# Patient Record
Sex: MALE
[De-identification: names, ages, dates, MRNs, and addresses within clinical notes are randomized per-mention and may not be internally consistent; named-entity substitution may affect disease eponyms.]

---

## 2018-04-17 ENCOUNTER — HOSPITAL ENCOUNTER (OUTPATIENT)
Dept: HOSPITAL 90 - SHCH | Age: 83
Discharge: HOME | End: 2018-04-17
Attending: INTERNAL MEDICINE
Payer: MEDICARE

## 2018-04-17 VITALS — HEIGHT: 64 IN | BODY MASS INDEX: 21.34 KG/M2 | WEIGHT: 125 LBS

## 2018-04-17 DIAGNOSIS — I25.119: Primary | ICD-10-CM

## 2018-04-17 PROCEDURE — 78452 HT MUSCLE IMAGE SPECT MULT: CPT

## 2018-04-17 PROCEDURE — 93017 CV STRESS TEST TRACING ONLY: CPT

## 2018-04-17 PROCEDURE — 96374 THER/PROPH/DIAG INJ IV PUSH: CPT

## 2019-01-19 ENCOUNTER — HOSPITAL ENCOUNTER (INPATIENT)
Dept: HOSPITAL 90 - EDH | Age: 84
LOS: 7 days | Discharge: NURSING, REHAB OR LONG-TERM CARE | End: 2019-01-26
Payer: MEDICARE

## 2019-01-19 DIAGNOSIS — A41.9: Primary | ICD-10-CM

## 2019-01-19 DIAGNOSIS — R64: ICD-10-CM

## 2019-01-19 DIAGNOSIS — I25.10: ICD-10-CM

## 2019-01-19 DIAGNOSIS — J96.01: ICD-10-CM

## 2019-01-19 DIAGNOSIS — J18.9: ICD-10-CM

## 2019-10-15 ENCOUNTER — HOSPITAL ENCOUNTER (INPATIENT)
Dept: HOSPITAL 90 - EDH | Age: 84
LOS: 1 days | Discharge: HOSPICE | DRG: 871 | End: 2019-10-16
Attending: INTERNAL MEDICINE | Admitting: INTERNAL MEDICINE
Payer: MEDICARE

## 2019-10-15 VITALS — WEIGHT: 131.5 LBS | BODY MASS INDEX: 19.93 KG/M2 | HEIGHT: 68 IN

## 2019-10-15 DIAGNOSIS — I25.10: ICD-10-CM

## 2019-10-15 DIAGNOSIS — E78.5: ICD-10-CM

## 2019-10-15 DIAGNOSIS — I10: ICD-10-CM

## 2019-10-15 DIAGNOSIS — K76.7: ICD-10-CM

## 2019-10-15 DIAGNOSIS — Z79.02: ICD-10-CM

## 2019-10-15 DIAGNOSIS — K72.00: ICD-10-CM

## 2019-10-15 DIAGNOSIS — E11.9: ICD-10-CM

## 2019-10-15 DIAGNOSIS — K72.90: ICD-10-CM

## 2019-10-15 DIAGNOSIS — K74.60: ICD-10-CM

## 2019-10-15 DIAGNOSIS — R64: ICD-10-CM

## 2019-10-15 DIAGNOSIS — K81.0: ICD-10-CM

## 2019-10-15 DIAGNOSIS — J81.1: ICD-10-CM

## 2019-10-15 DIAGNOSIS — I50.22: ICD-10-CM

## 2019-10-15 DIAGNOSIS — R65.21: ICD-10-CM

## 2019-10-15 DIAGNOSIS — E87.70: ICD-10-CM

## 2019-10-15 DIAGNOSIS — D68.9: ICD-10-CM

## 2019-10-15 DIAGNOSIS — Z51.5: ICD-10-CM

## 2019-10-15 DIAGNOSIS — N17.9: ICD-10-CM

## 2019-10-15 DIAGNOSIS — A41.9: Primary | ICD-10-CM

## 2019-10-15 DIAGNOSIS — G93.40: ICD-10-CM

## 2019-10-15 DIAGNOSIS — I48.91: ICD-10-CM

## 2019-10-15 DIAGNOSIS — R18.8: ICD-10-CM

## 2019-10-15 DIAGNOSIS — B17.9: ICD-10-CM

## 2019-10-15 DIAGNOSIS — J90: ICD-10-CM

## 2019-10-15 DIAGNOSIS — Z79.899: ICD-10-CM

## 2019-10-15 LAB
ALBUMIN SERPL-MCNC: 3.2 G/DL (ref 3.5–5)
APPEARANCE UR: (no result)
APTT PPP: 27 SEC (ref 26.3–35.5)
AST SERPL-CCNC: 1873 U/L (ref 10–37)
BASE EXCESS BLDA CALC-SCNC: -13.4 MMOL/L (ref -2–3)
BASOPHILS NFR BLD AUTO: 0.3 % (ref 0–5)
BILIRUB DIRECT SERPL-MCNC: 1.2 MG/DL (ref 0–0.3)
BILIRUB SERPL-MCNC: 2 MG/DL (ref 0.2–1)
BILIRUB UR QL STRIP: (no result)
BNP SERPL-MCNC: 1830 PG/ML (ref 0–100)
BUN SERPL-MCNC: 57 MG/DL (ref 7–18)
CHLORIDE SERPL-SCNC: 97 MMOL/L (ref 101–111)
CK SERPL-CCNC: 205 U/L (ref 21–232)
CO2 SERPL-SCNC: 13 MMOL/L (ref 21–32)
COLOR UR: (no result)
CREAT SERPL-MCNC: 2.8 MG/DL (ref 0.5–1.5)
DEPRECATED SQUAMOUS URNS QL MICRO: (no result) /HPF (ref 0–2)
EOSINOPHIL NFR BLD AUTO: 0 % (ref 0–8)
ERYTHROCYTE [DISTWIDTH] IN BLOOD BY AUTOMATED COUNT: 16.6 % (ref 11–15.5)
GFR SERPL CREATININE-BSD FRML MDRD: 23 ML/MIN (ref 60–?)
GLUCOSE SERPL-MCNC: 97 MG/DL (ref 70–105)
GLUCOSE UR STRIP-MCNC: NEGATIVE MG/DL
HCO3 BLDA-SCNC: 10.5 MMOL/L (ref 21–28)
HCT VFR BLD AUTO: 41.3 % (ref 42–54)
HGB UR QL STRIP: (no result)
INR PPP: 1.69 (ref 0.85–1.15)
KETONES UR STRIP-MCNC: 15 MG/DL
LEUKOCYTE ESTERASE UR QL STRIP: (no result)
LYMPHOCYTES NFR SPEC AUTO: 7.7 % (ref 21–51)
MCH RBC QN AUTO: 33.3 PG (ref 27–33)
MCHC RBC AUTO-ENTMCNC: 33.3 G/DL (ref 32–36)
MCV RBC AUTO: 100 FL (ref 79–99)
MONOCYTES NFR BLD AUTO: 9.3 % (ref 3–13)
NEUTROPHILS NFR BLD AUTO: 82.7 % (ref 40–77)
NITRITE UR QL STRIP: POSITIVE
NRBC BLD MANUAL-RTO: 0 % (ref 0–0.19)
PCO2 BLDA: 21 MMHG (ref 35–48)
PH UR STRIP: 6.5 [PH] (ref 5–8)
PLATELET # BLD AUTO: 197 K/UL (ref 130–400)
POTASSIUM SERPL-SCNC: 6.3 MMOL/L (ref 3.5–5.1)
PROT SERPL-MCNC: 6.8 G/DL (ref 6–8.3)
PROT UR QL STRIP: >=300 MG/DL
PROTHROMBIN TIME: 17.4 SEC (ref 9.6–11.6)
RBC # BLD AUTO: 4.13 MIL/UL (ref 4.5–6.2)
RBC #/AREA URNS HPF: (no result) /HPF (ref 0–1)
SAO2 % BLDA: 94.3 % (ref 95–99)
SODIUM SERPL-SCNC: 130 MMOL/L (ref 136–145)
SP GR UR STRIP: 1.03 (ref 1–1.03)
UROBILINOGEN UR STRIP-MCNC: >=8 MG/DL (ref 0.2–1)
WBC # BLD AUTO: 9.8 K/UL (ref 4.8–10.8)
WBC #/AREA URNS HPF: (no result) /HPF (ref 0–1)

## 2019-10-15 PROCEDURE — 82550 ASSAY OF CK (CPK): CPT

## 2019-10-15 PROCEDURE — 85730 THROMBOPLASTIN TIME PARTIAL: CPT

## 2019-10-15 PROCEDURE — 84484 ASSAY OF TROPONIN QUANT: CPT

## 2019-10-15 PROCEDURE — 80053 COMPREHEN METABOLIC PANEL: CPT

## 2019-10-15 PROCEDURE — 99291 CRITICAL CARE FIRST HOUR: CPT

## 2019-10-15 PROCEDURE — 85027 COMPLETE CBC AUTOMATED: CPT

## 2019-10-15 PROCEDURE — 83605 ASSAY OF LACTIC ACID: CPT

## 2019-10-15 PROCEDURE — 80048 BASIC METABOLIC PNL TOTAL CA: CPT

## 2019-10-15 PROCEDURE — 85610 PROTHROMBIN TIME: CPT

## 2019-10-15 PROCEDURE — 83735 ASSAY OF MAGNESIUM: CPT

## 2019-10-15 PROCEDURE — 84132 ASSAY OF SERUM POTASSIUM: CPT

## 2019-10-15 PROCEDURE — 81001 URINALYSIS AUTO W/SCOPE: CPT

## 2019-10-15 PROCEDURE — 82948 REAGENT STRIP/BLOOD GLUCOSE: CPT

## 2019-10-15 PROCEDURE — 36600 WITHDRAWAL OF ARTERIAL BLOOD: CPT

## 2019-10-15 PROCEDURE — 80074 ACUTE HEPATITIS PANEL: CPT

## 2019-10-15 PROCEDURE — 83880 ASSAY OF NATRIURETIC PEPTIDE: CPT

## 2019-10-15 PROCEDURE — 85025 COMPLETE CBC W/AUTO DIFF WBC: CPT

## 2019-10-15 PROCEDURE — 93005 ELECTROCARDIOGRAM TRACING: CPT

## 2019-10-15 PROCEDURE — 36415 COLL VENOUS BLD VENIPUNCTURE: CPT

## 2019-10-15 PROCEDURE — 82803 BLOOD GASES ANY COMBINATION: CPT

## 2019-10-15 PROCEDURE — 71045 X-RAY EXAM CHEST 1 VIEW: CPT

## 2019-10-15 PROCEDURE — 94640 AIRWAY INHALATION TREATMENT: CPT

## 2019-10-15 PROCEDURE — 76705 ECHO EXAM OF ABDOMEN: CPT

## 2019-10-15 PROCEDURE — 87040 BLOOD CULTURE FOR BACTERIA: CPT

## 2019-10-15 PROCEDURE — 80076 HEPATIC FUNCTION PANEL: CPT

## 2019-10-16 VITALS — SYSTOLIC BLOOD PRESSURE: 118 MMHG | DIASTOLIC BLOOD PRESSURE: 75 MMHG

## 2019-10-16 VITALS — SYSTOLIC BLOOD PRESSURE: 136 MMHG | DIASTOLIC BLOOD PRESSURE: 64 MMHG

## 2019-10-16 VITALS — SYSTOLIC BLOOD PRESSURE: 121 MMHG | DIASTOLIC BLOOD PRESSURE: 75 MMHG

## 2019-10-16 VITALS — DIASTOLIC BLOOD PRESSURE: 81 MMHG | SYSTOLIC BLOOD PRESSURE: 152 MMHG

## 2019-10-16 VITALS — DIASTOLIC BLOOD PRESSURE: 83 MMHG | SYSTOLIC BLOOD PRESSURE: 150 MMHG

## 2019-10-16 VITALS — DIASTOLIC BLOOD PRESSURE: 58 MMHG | SYSTOLIC BLOOD PRESSURE: 139 MMHG

## 2019-10-16 VITALS — DIASTOLIC BLOOD PRESSURE: 87 MMHG | SYSTOLIC BLOOD PRESSURE: 152 MMHG

## 2019-10-16 VITALS — SYSTOLIC BLOOD PRESSURE: 107 MMHG | DIASTOLIC BLOOD PRESSURE: 73 MMHG

## 2019-10-16 VITALS — SYSTOLIC BLOOD PRESSURE: 160 MMHG | DIASTOLIC BLOOD PRESSURE: 87 MMHG

## 2019-10-16 VITALS — SYSTOLIC BLOOD PRESSURE: 157 MMHG | DIASTOLIC BLOOD PRESSURE: 84 MMHG

## 2019-10-16 VITALS — DIASTOLIC BLOOD PRESSURE: 52 MMHG | SYSTOLIC BLOOD PRESSURE: 132 MMHG

## 2019-10-16 VITALS — SYSTOLIC BLOOD PRESSURE: 122 MMHG | DIASTOLIC BLOOD PRESSURE: 65 MMHG

## 2019-10-16 VITALS — DIASTOLIC BLOOD PRESSURE: 92 MMHG | SYSTOLIC BLOOD PRESSURE: 139 MMHG

## 2019-10-16 VITALS — SYSTOLIC BLOOD PRESSURE: 147 MMHG | DIASTOLIC BLOOD PRESSURE: 81 MMHG

## 2019-10-16 VITALS — DIASTOLIC BLOOD PRESSURE: 60 MMHG | SYSTOLIC BLOOD PRESSURE: 162 MMHG

## 2019-10-16 VITALS — DIASTOLIC BLOOD PRESSURE: 78 MMHG | SYSTOLIC BLOOD PRESSURE: 138 MMHG

## 2019-10-16 VITALS — SYSTOLIC BLOOD PRESSURE: 159 MMHG | DIASTOLIC BLOOD PRESSURE: 83 MMHG

## 2019-10-16 VITALS — DIASTOLIC BLOOD PRESSURE: 76 MMHG | SYSTOLIC BLOOD PRESSURE: 143 MMHG

## 2019-10-16 VITALS — SYSTOLIC BLOOD PRESSURE: 130 MMHG | DIASTOLIC BLOOD PRESSURE: 76 MMHG

## 2019-10-16 VITALS — SYSTOLIC BLOOD PRESSURE: 145 MMHG | DIASTOLIC BLOOD PRESSURE: 90 MMHG

## 2019-10-16 VITALS — DIASTOLIC BLOOD PRESSURE: 83 MMHG | SYSTOLIC BLOOD PRESSURE: 148 MMHG

## 2019-10-16 VITALS — SYSTOLIC BLOOD PRESSURE: 116 MMHG | DIASTOLIC BLOOD PRESSURE: 91 MMHG

## 2019-10-16 LAB
ALBUMIN SERPL-MCNC: 3.1 G/DL (ref 3.5–5)
BILIRUB SERPL-MCNC: 1.9 MG/DL (ref 0.2–1)
BUN SERPL-MCNC: 62 MG/DL (ref 7–18)
CHLORIDE SERPL-SCNC: 100 MMOL/L (ref 101–111)
CO2 SERPL-SCNC: 14 MMOL/L (ref 21–32)
CREAT SERPL-MCNC: 2.5 MG/DL (ref 0.5–1.5)
ERYTHROCYTE [DISTWIDTH] IN BLOOD BY AUTOMATED COUNT: 16.4 % (ref 11–15.5)
GFR SERPL CREATININE-BSD FRML MDRD: 26 ML/MIN (ref 60–?)
GLUCOSE SERPL-MCNC: 67 MG/DL (ref 70–105)
HCT VFR BLD AUTO: 35.3 % (ref 42–54)
MAGNESIUM SERPL-MCNC: 1.9 MG/DL (ref 1.8–2.4)
MCH RBC QN AUTO: 33.5 PG (ref 27–33)
MCHC RBC AUTO-ENTMCNC: 33.9 G/DL (ref 32–36)
MCV RBC AUTO: 98.8 FL (ref 79–99)
NRBC BLD MANUAL-RTO: 0 % (ref 0–0.19)
PLATELET # BLD AUTO: 150 K/UL (ref 130–400)
POTASSIUM SERPL-SCNC: 5.9 MMOL/L (ref 3.5–5.1)
PROT SERPL-MCNC: 6.1 G/DL (ref 6–8.3)
RBC # BLD AUTO: 3.57 MIL/UL (ref 4.5–6.2)
SODIUM SERPL-SCNC: 131 MMOL/L (ref 136–145)
WBC # BLD AUTO: 12.7 K/UL (ref 4.8–10.8)

## 2019-10-16 RX ADMIN — OCTREOTIDE ACETATE SCH MCG: 100 INJECTION, SOLUTION INTRAVENOUS; SUBCUTANEOUS at 08:53

## 2019-10-16 RX ADMIN — NITROGLYCERIN SCH INCH: 20 OINTMENT TOPICAL at 06:29

## 2019-10-16 RX ADMIN — NITROGLYCERIN SCH INCH: 20 OINTMENT TOPICAL at 12:30

## 2019-10-16 RX ADMIN — SODIUM POLYSTYRENE SULFONATE NR GM: 15 SUSPENSION ORAL; RECTAL at 01:29

## 2019-10-16 RX ADMIN — NITROGLYCERIN SCH INCH: 20 OINTMENT TOPICAL at 06:03

## 2019-10-16 RX ADMIN — OCTREOTIDE ACETATE SCH MCG: 100 INJECTION, SOLUTION INTRAVENOUS; SUBCUTANEOUS at 00:55

## 2019-10-16 RX ADMIN — Medication SCH MLS/HR: at 13:08

## 2019-10-16 RX ADMIN — SODIUM POLYSTYRENE SULFONATE NR GM: 15 SUSPENSION ORAL; RECTAL at 04:43

## 2019-10-16 RX ADMIN — ALBUMIN HUMAN SCH MLS/HR: 50 SOLUTION INTRAVENOUS at 18:00

## 2019-10-16 RX ADMIN — MIDODRINE HYDROCHLORIDE SCH MG: 5 TABLET ORAL at 16:00

## 2019-10-16 RX ADMIN — Medication SCH MLS/HR: at 00:30

## 2019-10-16 RX ADMIN — DEXTROSE SCH MEQ: 10 SOLUTION INTRAVENOUS at 00:45

## 2019-10-16 RX ADMIN — PIPERACILLIN SODIUM AND TAZOBACTAM SODIUM SCH MLS/HR: .375; 3 INJECTION, POWDER, LYOPHILIZED, FOR SOLUTION INTRAVENOUS at 06:02

## 2019-10-16 RX ADMIN — PIPERACILLIN SODIUM AND TAZOBACTAM SODIUM SCH MLS/HR: .375; 3 INJECTION, POWDER, LYOPHILIZED, FOR SOLUTION INTRAVENOUS at 18:00

## 2019-10-16 RX ADMIN — DEXTROSE SCH MEQ: 10 SOLUTION INTRAVENOUS at 05:43

## 2019-10-16 RX ADMIN — NITROGLYCERIN SCH INCH: 20 OINTMENT TOPICAL at 18:30

## 2019-10-16 RX ADMIN — ALBUMIN HUMAN SCH MLS/HR: 50 SOLUTION INTRAVENOUS at 13:07

## 2019-10-16 RX ADMIN — ALBUMIN HUMAN SCH MLS/HR: 50 SOLUTION INTRAVENOUS at 06:03

## 2019-10-16 RX ADMIN — OCTREOTIDE ACETATE SCH MCG: 100 INJECTION, SOLUTION INTRAVENOUS; SUBCUTANEOUS at 16:00

## 2019-10-16 RX ADMIN — MIDODRINE HYDROCHLORIDE SCH MG: 5 TABLET ORAL at 08:52

## 2019-10-16 RX ADMIN — MIDODRINE HYDROCHLORIDE SCH MG: 5 TABLET ORAL at 00:46

## 2019-10-16 RX ADMIN — NITROGLYCERIN SCH INCH: 20 OINTMENT TOPICAL at 00:00

## 2019-10-16 RX ADMIN — ALBUMIN HUMAN SCH MLS/HR: 50 SOLUTION INTRAVENOUS at 00:45

## 2019-10-16 RX ADMIN — SODIUM CHLORIDE SCH GM: 9 INJECTION, SOLUTION INTRAVENOUS at 05:43

## 2019-10-16 RX ADMIN — SODIUM CHLORIDE SCH GM: 9 INJECTION, SOLUTION INTRAVENOUS at 00:45

## 2019-10-16 NOTE — NUR
DCP:HOME WITH FAMILY



Sw met with pt and grand daughter Delaney Tena [phone]. Pt lives with son Rio Tena and wife 
Maki [phone]. Pt is independent of ADLS, has provider 3hrs day [facility] to assist 
with home management and meal prep. Pt has no DME or HH services. PCP is [facility] Gonzalez and 
[facility] is pharm of choice. Pt has no MPOa or directives, is his own decision maker. Plan is 
home with family

-------------------------------------------------------------------------------

Addendum: 10/16/19 at 1129 by MELISSA MOSQUEDA

-------------------------------------------------------------------------------

Amended: Links added.

## 2019-10-16 NOTE — NUR
DISCHARGE

[facility] HERE TO TAKE PT HOME ON HOSPICE DISCHARGE INFORMATION GIVEN TO SON WITH HOSPICE PHONE 
NUMBER. ALL BELONGINGS AND MEDICATIONS TAKEN BY SON

## 2019-10-16 NOTE — NUR
RECEIVED

BY ANNA FROM ER. MOVED TO ICU . PLACED ON CARDIAC MONITOR PULSE OXIMETER NIBP 
PULSE OX. DENIES PAIN. SON HERE WHO IS HIS CARE GIVER AND ANSWERS ADMISSION QUESTIONS. CALL 
LIGHT GIVEN AND EXPLAINED.

## 2019-10-16 NOTE — NUR
HOME WITH [facility] [phone]



Sw met with pt's son whom pt has lived with for the past 30years. Per son, he is agreeable 
to home with [facility] [phone]. Signed signed consent and OOHDNR.

Candi spoke to Leena at [facility] who met with son and consents were signed. Leena ordered DME 
and dc pending delivery. Leena to have their medical director sign OOHDNR

Cm informed of need for ambulance transport.

## (undated) PROCEDURE — 5A1945Z RESPIRATORY VENTILATION, 24-96 CONSECUTIVE HOURS: ICD-10-PCS